# Patient Record
Sex: MALE | Race: WHITE | URBAN - METROPOLITAN AREA
[De-identification: names, ages, dates, MRNs, and addresses within clinical notes are randomized per-mention and may not be internally consistent; named-entity substitution may affect disease eponyms.]

---

## 2019-02-25 ENCOUNTER — HISTORICAL (OUTPATIENT)
Dept: ADMINISTRATIVE | Facility: HOSPITAL | Age: 39
End: 2019-02-25

## 2022-04-11 ENCOUNTER — HISTORICAL (OUTPATIENT)
Dept: ADMINISTRATIVE | Facility: HOSPITAL | Age: 42
End: 2022-04-11

## 2022-04-24 VITALS
SYSTOLIC BLOOD PRESSURE: 124 MMHG | OXYGEN SATURATION: 98 % | WEIGHT: 138.44 LBS | HEIGHT: 64 IN | DIASTOLIC BLOOD PRESSURE: 87 MMHG | BODY MASS INDEX: 23.64 KG/M2

## 2023-04-17 ENCOUNTER — HOSPITAL ENCOUNTER (EMERGENCY)
Facility: HOSPITAL | Age: 43
Discharge: HOME | End: 2023-04-17
Attending: EMERGENCY MEDICINE
Payer: COMMERCIAL

## 2023-04-17 VITALS
HEART RATE: 78 BPM | HEIGHT: 65 IN | SYSTOLIC BLOOD PRESSURE: 124 MMHG | DIASTOLIC BLOOD PRESSURE: 86 MMHG | OXYGEN SATURATION: 99 % | BODY MASS INDEX: 21.66 KG/M2 | RESPIRATION RATE: 18 BRPM | WEIGHT: 130 LBS | TEMPERATURE: 97 F
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
## 2023-04-17 DIAGNOSIS — S22.31XA CLOSED FRACTURE OF ONE RIB OF RIGHT SIDE, INITIAL ENCOUNTER: Primary | ICD-10-CM

## 2023-04-17 PROCEDURE — 99284 EMERGENCY DEPT VISIT MOD MDM: CPT | Mod: 25

## 2023-04-17 RX ORDER — HYDROCODONE BITARTRATE AND ACETAMINOPHEN 5; 325 MG/1; MG/1
1 TABLET ORAL EVERY 6 HOURS PRN
Qty: 12 TABLET | Refills: 0 | Status: SHIPPED | OUTPATIENT
Start: 2023-04-17

## 2023-04-17 NOTE — ED PROVIDER NOTES
Encounter Date: 4/17/2023         History     Chief Complaint   Patient presents with    Chest Pain     Pt to ER via POV for right chest pain.  Pt states his son fell on him and he feels like he has broken ribs.  Happened several days ago     43 y.o. male presents to the E.D. with c/o right sided chest wall pain after son fell on him 3 days ago. History of fracturing ribs on that side. Patient states pain worsens with coughing and movement. Patient denies any fever.     The history is provided by the patient. No  was used.   Fall  The accident occurred two days ago. The pain is present in the chest. The pain is at a severity of 6/10. He was Not ambulatory at the scene. There was No entrapment after the fall. Pertinent negatives include no neck pain, no back pain, no paralysis, no fever, no nausea and no vomiting. The symptoms are aggravated by activity. He has tried nothing for the symptoms.   Review of patient's allergies indicates:  No Known Allergies  No past medical history on file.  No past surgical history on file.  No family history on file.     Review of Systems   Constitutional:  Negative for fever.   HENT:  Negative for sore throat.    Respiratory:  Negative for cough, chest tightness and shortness of breath.    Cardiovascular:  Negative for chest pain.   Gastrointestinal:  Negative for nausea and vomiting.   Genitourinary:  Negative for dysuria.   Musculoskeletal:  Negative for back pain and neck pain.   Skin:  Negative for rash.   Neurological:  Negative for weakness.   Hematological:  Does not bruise/bleed easily.     Physical Exam     Initial Vitals [04/17/23 1325]   BP Pulse Resp Temp SpO2   124/86 78 18 96.8 °F (36 °C) 99 %      MAP       --         Physical Exam    Nursing note and vitals reviewed.  Constitutional: He appears well-developed and well-nourished.   HENT:   Head: Normocephalic and atraumatic.   Eyes: EOM are normal. Pupils are equal, round, and reactive to light.    Cardiovascular:  Normal rate, regular rhythm and normal heart sounds.           Pulmonary/Chest: Breath sounds normal. He has no wheezes. He has no rales. He exhibits tenderness.   Right sided intercostal muscle tenderness    Abdominal: Abdomen is soft.   Musculoskeletal:         General: Normal range of motion.     Neurological: He is alert and oriented to person, place, and time.   Skin: Skin is warm and dry.       ED Course   Procedures  Labs Reviewed - No data to display       Imaging Results              X-Ray Ribs 2 View Right (Final result)  Result time 04/17/23 14:06:23      Final result by Megan Deshpande MD (04/17/23 14:06:23)                   Impression:      Fracture of the right lateral 4th rib.      Electronically signed by: Megan Deshpande  Date:    04/17/2023  Time:    14:06               Narrative:    EXAMINATION:  XR RIBS 2 VIEW RIGHT    CLINICAL HISTORY:  Unspecified fall, initial encounter    COMPARISON:  None.    FINDINGS:  There is a fracture of the right 4th lateral rib.  The right lung is clear without pleural effusion or visible pneumothorax.                                       Medications - No data to display                           Clinical Impression:   Final diagnoses:  [W19.XXXA] Fall  [S22.31XA] Closed fracture of one rib of right side, initial encounter (Primary)        ED Disposition Condition    Discharge Stable          ED Prescriptions       Medication Sig Dispense Start Date End Date Auth. Provider    HYDROcodone-acetaminophen (NORCO) 5-325 mg per tablet Take 1 tablet by mouth every 6 (six) hours as needed for Pain. 12 tablet 4/17/2023 -- Cynthia Liang PA-C          Follow-up Information       Follow up With Specialties Details Why Contact Info    [facility] In 1 week As needed [address] [place] [address]  [phone]               Cynthia Liang PA-C  04/17/23 7805

## 2023-04-17 NOTE — Clinical Note
"Jese Joe" Katherine was seen and treated in our emergency department on 4/17/2023.  He may return to work on 04/19/2023.       If you have any questions or concerns, please don't hesitate to call.      Cynthia Liang PA-C"